# Patient Record
(demographics unavailable — no encounter records)

---

## 2025-03-20 NOTE — HISTORY OF PRESENT ILLNESS
[FreeTextEntry1] : The patient comes in with her mother.  She is known to me from my previous practice has not been seen in over 3 to 4 years.  She has a cystic mass of the right lateral chest wall for over 5 years.  It is increasing in size and she would like to have a surgical consultation regarding the nature of potential surgical removal. Of important note she was away on a spring break trip to [place] and developed periorbital edema redness and swelling she is on a course of antibiotics the etiology is unknown the area is improving.  Told her to seek out an allergist and dermatologist or rheumatologist if the problem persists.  She is to use Dr. Trinidad Carroll for pediatrics that Dr. Meli Lara patient will need internal medicine doctor at some point. Past medical history asthma. Medications albuterol as needed. Allergies to medications none Previous surgery none

## 2025-03-20 NOTE — REASON FOR VISIT
[Initial Evaluation] : an initial evaluation [FreeTextEntry1] : Pt with chief complaint of mass right chest wall x 5 years and growing in size

## 2025-03-20 NOTE — PHYSICAL EXAM
[TextEntry] : Physical exam demonstrates a well-developed well-nourished 20-year-old no acute distress pupils equal round reactive to light sclera nonicteric periorbital erythema and resolving edema.  No scleral injection.  Extraocular muscles intact pupils equal round reactive to light no other lesions of the face.  Right lateral chest wall 7th-8th rib area is a mobile cutaneous cystic mass consistent with intradermal epidermal inclusion cyst.  2 cm.

## 2025-03-20 NOTE — ASSESSMENT
[FreeTextEntry1] : This could be excised in the office under local anesthesia with surgical repair potential complications include bleeding infection poor scarring asymmetry and possible need for future visual surgeries specimen was sent for pathology. This procedure can be done under local anesthesia in the office.  To moderately complex take 45 minutes to an hour.  There will be postoperative activity restrictions and wound care.  Still careful explained to the patient and mother all the questions were answered we chose a surgical date for when she returns from college.  I spent 45 minutes with this patient encounter excluding teaching and procedure time.

## 2025-05-15 NOTE — PROCEDURE
[Nl] : None [FreeTextEntry1] : cystic mass right lateral chest wall, 2.5 cm [FreeTextEntry2] : excision of rt chest wall mass, plastic surgery 3 layer closure [FreeTextEntry3] : lidocaine with epi 10 cc [FreeTextEntry6] : pt assisted on the procedure room table in left lateral decubitus position, rt side up.  lesion marked for 3 cm elliptical excision, infiltrated with local anesthetic.  Lesion excised with a 15 blade scalpel down to deep subcutaneous fat and superficial fascia.  Small bleeding points controlled with electrocautery.  Wound edges undermined for tension-free flap 3 layer closure with 2-0 Vicryl superficial fascia 3-0 Vicryl dermis running subcuticular 4-0 Monocryl 3 cm.  Specimen sent for pathology. [FreeTextEntry7] : cystic mass to path

## 2025-05-16 NOTE — REASON FOR VISIT
[FreeTextEntry1] : BAILEY SELF [Follow-Up: _____] : a [unfilled] follow-up visit [TextEntry] : pt is s/p rt chest cyst excision, path is benign cyst,  wound looks excellent.  instructions given, f/u prn